# Patient Record
Sex: MALE | Race: BLACK OR AFRICAN AMERICAN | NOT HISPANIC OR LATINO | Employment: STUDENT | URBAN - METROPOLITAN AREA
[De-identification: names, ages, dates, MRNs, and addresses within clinical notes are randomized per-mention and may not be internally consistent; named-entity substitution may affect disease eponyms.]

---
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
## 2023-06-27 ENCOUNTER — HOSPITAL ENCOUNTER (EMERGENCY)
Facility: HOSPITAL | Age: 18
Discharge: HOME | End: 2023-06-27
Attending: EMERGENCY MEDICINE
Payer: MEDICAID

## 2023-06-27 VITALS
OXYGEN SATURATION: 99 % | HEIGHT: 72 IN | BODY MASS INDEX: 17.91 KG/M2 | DIASTOLIC BLOOD PRESSURE: 70 MMHG | TEMPERATURE: 98 F | SYSTOLIC BLOOD PRESSURE: 103 MMHG | WEIGHT: 132.25 LBS | HEART RATE: 71 BPM | RESPIRATION RATE: 16 BRPM

## 2023-06-27 DIAGNOSIS — R11.2 NAUSEA, VOMITING, AND DIARRHEA: Primary | ICD-10-CM

## 2023-06-27 DIAGNOSIS — R19.7 NAUSEA, VOMITING, AND DIARRHEA: Primary | ICD-10-CM

## 2023-06-27 LAB
CTP QC/QA: YES
INFLUENZA A ANTIGEN, POC: NEGATIVE
INFLUENZA B ANTIGEN, POC: NEGATIVE
SARS-COV-2 RDRP RESP QL NAA+PROBE: NEGATIVE

## 2023-06-27 PROCEDURE — 87804 INFLUENZA ASSAY W/OPTIC: CPT | Mod: ER

## 2023-06-27 PROCEDURE — 99282 EMERGENCY DEPT VISIT SF MDM: CPT | Mod: ER

## 2023-06-27 PROCEDURE — 87635 SARS-COV-2 COVID-19 AMP PRB: CPT | Mod: ER | Performed by: EMERGENCY MEDICINE

## 2023-06-27 NOTE — ED PROVIDER NOTES
Encounter Date: 6/27/2023    SCRIBE #1 NOTE: I, Sara Avila, am scribing for, and in the presence of,  Katelyn Bee MD. I have scribed the following portions of the note - Other sections scribed: HPI, ROS, PE.     History     Chief Complaint   Patient presents with    General Illness     Reports doesn't feel well- C/O body aching and n/v last pm none today     17 y.o. male with no known PMHx who presents to the ED for chief complaint of generalized myalgias with episodes of nausea and vomiting since yesterday. Patient has not vomited today, but denies any attempted PO intake. He reports associated diarrhea. No known sick contacts. Denies fever or other symptoms. NKDA.    The history is provided by the patient. No  was used.   Review of patient's allergies indicates:  No Known Allergies  History reviewed. No pertinent past medical history.  History reviewed. No pertinent surgical history.  History reviewed. No pertinent family history.  Social History     Tobacco Use    Smoking status: Never    Smokeless tobacco: Never   Substance Use Topics    Alcohol use: Not Currently     Review of Systems   Constitutional:  Negative for activity change, appetite change, chills and fever.   HENT:  Negative for congestion, rhinorrhea, sneezing and sore throat.    Respiratory:  Negative for cough, chest tightness, shortness of breath and wheezing.    Cardiovascular:  Negative for chest pain and palpitations.   Gastrointestinal:  Positive for diarrhea, nausea and vomiting. Negative for abdominal pain.   Musculoskeletal:  Positive for myalgias.   Skin:  Negative for rash.   Neurological:  Negative for dizziness, light-headedness and headaches.   All other systems reviewed and are negative.    Physical Exam     Initial Vitals [06/27/23 0856]   BP Pulse Resp Temp SpO2   103/70 73 16 98.2 °F (36.8 °C) 99 %      MAP       --         Physical Exam    Nursing note and vitals reviewed.  Constitutional: He appears  well-developed and well-nourished. No distress.   HENT:   Head: Normocephalic and atraumatic.   Eyes: Conjunctivae are normal.   Neck:   Normal range of motion.  Cardiovascular:  Normal rate and regular rhythm.           No murmur heard.  Pulmonary/Chest: Breath sounds normal. No respiratory distress.   Abdominal: Bowel sounds are normal. He exhibits no distension. There is no abdominal tenderness.   Musculoskeletal:         General: No tenderness or edema. Normal range of motion.      Cervical back: Normal range of motion.     Neurological: He is alert and oriented to person, place, and time.   Skin: Skin is warm and dry. No rash noted.   Psychiatric: He has a normal mood and affect. His behavior is normal.       ED Course   Procedures  Labs Reviewed   SARS-COV-2 RDRP GENE    Narrative:     This test utilizes isothermal nucleic acid amplification technology to detect the SARS-CoV-2 RdRp nucleic acid segment. The analytical sensitivity (limit of detection) is 500 copies/swab.     A POSITIVE result is indicative of the presence of SARS-CoV-2 RNA; clinical correlation with patient history and other diagnostic information is necessary to determine patient infection status.    A NEGATIVE result means that SARS-CoV-2 nucleic acids are not present above the limit of detection. A NEGATIVE result should be treated as presumptive. It does not rule out the possibility of COVID-19 and should not be the sole basis for treatment decisions. If COVID-19 is strongly suspected based on clinical and exposure history, re-testing using an alternate molecular assay should be considered.     This test is only for use under the Food and Drug Administration s Emergency Use Authorization (EUA).     Commercial kits are provided by [employer]. Performance characteristics of the EUA have been independently verified by [facility] Department of Pathology and Laboratory Medicine.    _________________________________________________________________   The authorized Fact Sheet for Healthcare Providers and the authorized Fact Sheet for Patients of the ID NOW COVID-19 are available on the FDA website:    https://www.fda.gov/media/188452/download      https://www.fda.gov/media/725357/download      POCT INFLUENZA A/B MOLECULAR   POCT RAPID INFLUENZA A/B          Imaging Results    None          Medications - No data to display  Medical Decision Making:   History:   Old Medical Records: I decided to obtain old medical records.  Old Records Summarized: other records.       <> Summary of Records: External documents reviewed  Clinical Tests:   Lab Tests: Reviewed and Ordered     Medical Decision Making  17 y.o. male with no known PMHx who presents to the ED for chief complaint of generalized myalgias with episodes of nausea and vomiting since yesterday. Physical exam is unremarkable. In shared decision making with the patient we discussed testing for covid and flu then PO challenge, nausea medication to be ordered if necessary. I considered but excluded covid, flu, sepsis, dehydration. Pt tolerated PO challenge without difficulty.    Amount and/or Complexity of Data Reviewed  Labs: ordered.           Scribe Attestation:   Scribe #1: I performed the above scribed service and the documentation accurately describes the services I performed. I attest to the accuracy of the note.                 I, Dr. Katelyn Bee, personally performed the services described in this documentation.   All medical record entries made by the scribe were at my direction and in my presence.   I have reviewed the chart and agree that the record is accurate and complete.   Katelyn Bee MD.  9:37 AM 06/27/2023   Clinical Impression:   Final diagnoses:  [R11.2, R19.7] Nausea, vomiting, and diarrhea (Primary)        ED Disposition Condition    Discharge Stable          ED Prescriptions    None       Follow-up Information    None           Katelyn Bee MD  06/27/23 8372

## 2023-06-27 NOTE — Clinical Note
"Linsey Sanford" Tonny was seen and treated in our emergency department on 6/27/2023.  He may return to work on 06/28/2023.       If you have any questions or concerns, please don't hesitate to call.      Katelyn Bee MD"

## 2023-06-27 NOTE — DISCHARGE INSTRUCTIONS
Drink lots of clear liquids today. Eat a bland diet. Read attached guidelines. Return to ER for any concerns or worsening symptoms.